# Patient Record
Sex: MALE | Race: WHITE | NOT HISPANIC OR LATINO | Employment: FULL TIME | URBAN - METROPOLITAN AREA
[De-identification: names, ages, dates, MRNs, and addresses within clinical notes are randomized per-mention and may not be internally consistent; named-entity substitution may affect disease eponyms.]

---

## 2024-01-10 ENCOUNTER — TRANSCRIBE ORDERS (OUTPATIENT)
Dept: ADMINISTRATIVE | Facility: HOSPITAL | Age: 39
End: 2024-01-10
Payer: COMMERCIAL

## 2024-01-10 ENCOUNTER — HOSPITAL ENCOUNTER (OUTPATIENT)
Dept: GENERAL RADIOLOGY | Facility: HOSPITAL | Age: 39
Discharge: HOME | End: 2024-01-10
Payer: COMMERCIAL

## 2024-01-10 DIAGNOSIS — M25.562 ACUTE PAIN OF LEFT KNEE: Primary | ICD-10-CM

## 2024-01-10 DIAGNOSIS — M25.562 ACUTE PAIN OF LEFT KNEE: ICD-10-CM

## 2024-01-10 PROCEDURE — 73562 X-RAY EXAM OF KNEE 3: CPT

## 2024-02-02 NOTE — PROGRESS NOTES
New Knee      Patient: Richard Yoo        YOB: 1985    Medical Record Number: [id number]        Chief Complaints: Left knee pain      History of Present Illness: This is a 38-year-old male who presents complaining of left knee pain he states it began January 10 of this year he stumbled in his front yard his left knee popped but he states it never really swelled it was never hurting at night he did do ice heat red light therapy and he states his symptoms are nearly gone he can do just about everything he wants to do he works at [employer] and is able to do that he does have a history when he was younger of a leg length discrepancy and 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     No current facility-administered medications on file prior to visit.     Current Medications:  Scheduled Meds:  Continuous Infusions:No current facility-administered medications for this visit.    PRN Meds:.    No past medical history on file.   No past surgical history on file.     Social History     Occupational History    Not on file   Tobacco Use    Smoking status: Not on file    Smokeless tobacco: Not on file   Substance and Sexual Activity    Alcohol use: Not on file    Drug use: Not on file    Sexual activity: Not on file      Social History     Social History Narrative    Not on file      No family history on file.          Review of Systems:     Review of Systems      Physical Exam: 38 y.o. male  General Appearance:    Alert, cooperative, in no acute distress                 There were no vitals filed for this visit.   Patient is alert and read ×3 no acute distress appears her above-listed at height weight and age.  Affect is normal respiratory rate is normal unlabored. Heart rate regular rate rhythm, sclera, dentition and hearing are normal for the purpose of this  exam.        Ortho Exam  Exam of the left knee no effusion no redness he does have healed tiny surgical incisions where they shut down his growth plates his collaterals his ACL PCL appear intact he has a negative bounce home and negative Acacia's stable Temaz exam calf is soft and nontender  Procedures             Radiology:   AP, Lateral and merchant views of the left knee  were /reviewed to evauateknee pain.  I did review these in epic these are normal also reviewed his MRI which shows some changes in the lateral collateral I have reviewed and agree  Imaging Results (Most Recent)       None          Assessment/Plan:      Left knee injury which has resolved there are some changes lateral collateral collateral by exam and by his clinical presentation I do not think this is acute.  I am wondering how much of the findings on MRI related to his surgery to shut down his growth plate.  At any rate he is doing fine he is asymptomatic he will progress his activities we talked about the importance of quad and core strengthening and specific things for that if his knee starts bothering him again we will get him back in here

## 2024-02-05 ENCOUNTER — OFFICE VISIT (OUTPATIENT)
Dept: ORTHOPEDIC SURGERY | Facility: CLINIC | Age: 39
End: 2024-02-05
Payer: COMMERCIAL

## 2024-02-05 VITALS — WEIGHT: 189.5 LBS | TEMPERATURE: 98.2 F | HEIGHT: 75 IN | BODY MASS INDEX: 23.56 KG/M2

## 2024-02-05 DIAGNOSIS — M25.562 ACUTE PAIN OF LEFT KNEE: Primary | ICD-10-CM

## 2024-02-05 PROCEDURE — 99203 OFFICE O/P NEW LOW 30 MIN: CPT | Performed by: ORTHOPAEDIC SURGERY

## 2024-02-05 RX ORDER — BETAMETHASONE DIPROPIONATE 0.5 MG/G
SPRAY TOPICAL
COMMUNITY

## 2024-02-05 RX ORDER — VITAMIN B COMPLEX
CAPSULE ORAL DAILY
COMMUNITY

## 2024-02-05 RX ORDER — MULTIPLE VITAMINS W/ MINERALS TAB 9MG-400MCG
1 TAB ORAL DAILY
COMMUNITY

## 2024-02-05 RX ORDER — CETIRIZINE HYDROCHLORIDE 10 MG/1
CAPSULE, LIQUID FILLED ORAL
COMMUNITY

## 2024-02-05 RX ORDER — L.ACID,CASEI/B.ANIMAL/S.THERMO 16B CELL
1 CAPSULE ORAL DAILY
COMMUNITY

## 2024-05-28 ENCOUNTER — OFFICE VISIT (OUTPATIENT)
Dept: SURGERY | Facility: CLINIC | Age: 39
End: 2024-05-28
Payer: COMMERCIAL

## 2024-05-28 VITALS — BODY MASS INDEX: 23.75 KG/M2 | WEIGHT: 191 LBS | RESPIRATION RATE: 16 BRPM | HEIGHT: 75 IN

## 2024-05-28 DIAGNOSIS — K62.9 PERIANAL LESION: Primary | ICD-10-CM

## 2024-05-29 NOTE — PROGRESS NOTES
General Surgery/Colorectal Surgery Note    Patient Name:  Richard Yoo  YOB: 1985  [id number]    Referring Provider: Steffi Santamaria APRN      Patient Care Team:  Lashell Kearns APRN as PCP - General (Nurse Practitioner)  Pancho Vincent MD as Consulting Physician (General Surgery)    Chief complaint anal lesion    Subjective .     History of present illness:    Comes in for concern of a mole to the left side of his anus.  Unknown onset.  No history of the same.  No personal family history of colorectal cancer, anal cancer, melanoma.  No blood thinner use.  Asymptomatic with no pain bleeding.      History:  History reviewed. No pertinent past medical history.    Past Surgical History:   Procedure Laterality Date    CYST REMOVAL      KNEE SURGERY      NEPHRECTOMY Right        History reviewed. No pertinent family history.    Social History     Tobacco Use    Smoking status: Never    Smokeless tobacco: Never   Vaping Use    Vaping status: Never Used   Substance Use Topics    Alcohol use: Yes     Comment: once a month if that    Drug use: Defer       Review of Systems  All systems were reviewed and negative except for:   Review of Systems   Constitutional: Negative for chills, fever and unexpected weight loss.   HENT: Negative for congestion, nosebleeds and voice change.    Eyes: Negative for blurred vision, double vision and discharge.   Respiratory: Negative for apnea, chest tightness and shortness of breath.    Cardiovascular: Negative for chest pain and leg swelling.   Gastrointestinal: Negative nausea vomit diarrhea abdominal pain   Endocrine: Negative for cold intolerance and heat intolerance.   Genitourinary: Negative for dysuria, hematuria and urgency.   Musculoskeletal: Negative for back pain, joint swelling and neck pain.   Skin: Negative for color change and dry skin.   Neurological: Negative for dizziness and confusion.   Hematological: Negative for adenopathy.    Psychiatric/Behavioral: Negative for agitation and behavioral problems.     MEDS:  Prior to Admission medications    Medication Sig Start Date End Date Taking? Authorizing Provider   B Complex Vitamins (vitamin b complex) capsule capsule Take  by mouth Daily.   Yes Nahid Diaz MD   Cetirizine HCl (ZyrTEC Allergy) 10 MG capsule ZyrTEC   Yes Nahid Diaz MD   multivitamin with minerals tablet tablet Take 1 tablet by mouth Daily.   Yes Nahid Diaz MD   Probiotic Product (Risaquad-2) capsule capsule Take 1 capsule by mouth Daily.   Yes Nahid Diaz MD   Sernivo 0.05 % emulsion APPLY TO THE AFFECTED AREA(S) OF THE ELBOWS, LOWER LEGS AND FEET TWICE A DAY. DO NOT APPLY TO FACE OR FOLDS   Yes Nahid Diaz MD        Allergies:  Kiwi extract    Objective     Vital Signs        Physical Exam:     General Appearance:    Alert, cooperative, in no acute distress   Head:    Normocephalic, without obvious abnormality, atraumatic   Eyes:          Conjunctivae and sclerae normal, no icterus,     Ears:    Ears appear intact with no abnormalities noted   Throat:   No oral lesions, no thrush, oral mucosa moist   Neck:   No adenopathy, supple, trachea midline, no thyromegaly   Back:     No kyphosis present, no scoliosis present, no skin lesions,      erythema or scars, no tenderness to percussion or                   palpation,   range of motion normal   Lungs:     Clear to auscultation,respirations regular, even and                  unlabored    Heart:    Regular rhythm and normal rate, normal S1 and S2, no            murmur, no gallop, no rub, no click   Chest Wall:    No abnormalities observed   Abdomen:     Normal bowel sounds, no masses, no organomegaly, soft        non-tender, non-distended, no guarding, no rebound                tenderness   Rectal:   Mild to the left of the anus, no features concerning for melanoma at this time, nonraised, irregular border, only 1 color  "  Extremities:   Moves all extremities well, no edema, no cyanosis, no             redness   Pulses:   Pulses palpable and equal bilaterally   Skin:   No bleeding, bruising or rash   Lymph nodes:   No palpable adenopathy   Neurologic:   A/o x 4 with no deficits       Results Review:   {Results Review:02050::\"I reviewed the patient's new clinical results.\"    LABS/IMAGING:  No results found for this or any previous visit.     Result Review :     Assessment & Plan     Perianal mole    I reassured the patient's there is no evidence of any cancer.  Follow-up with me in 1 year for skin check.  Follow-up sooner for change in the bowel, elevation, bleeding, concern.  All questions answered.  He agrees with the plan.  Thank for the consult.           This document has been electronically signed by Pancho Vincent MD  May 29, 2024 11:13 EDT  "

## 2025-06-03 ENCOUNTER — OFFICE VISIT (OUTPATIENT)
Dept: SURGERY | Facility: CLINIC | Age: 40
End: 2025-06-03
Payer: COMMERCIAL

## 2025-06-03 VITALS — HEIGHT: 75 IN | BODY MASS INDEX: 24.25 KG/M2 | WEIGHT: 195 LBS

## 2025-06-03 DIAGNOSIS — K62.9 LESION OF PERIANAL AREA: Primary | ICD-10-CM

## 2025-06-03 RX ORDER — EPINEPHRINE 0.3 MG/.3ML
INJECTION SUBCUTANEOUS
COMMUNITY
Start: 2025-02-26

## 2025-06-03 RX ORDER — ROFLUMILAST 1.5 MG/G
CREAM TOPICAL
COMMUNITY
Start: 2025-05-07

## 2025-06-03 NOTE — LETTER
June 5, 2025     ENOCH Gallego  [address]  [address] [place] [id number]    Patient: Richard oYo   YOB: 1985   Date of Visit: 6/3/2025     Dear ENOCH Gallego:       Thank you for referring Richard Yoo to me for evaluation. Below are the relevant portions of my assessment and plan of care.    If you have questions, please do not hesitate to call me. I look forward to following Richard along with you.         Sincerely,        Pancho Vincent MD        CC: No Recipients    Pancho Vincent MD  06/05/25 0817  Sign when Signing Visit  General Surgery/Colorectal Surgery Note    Patient Name:  Richard Yoo  YOB: 1985  [id number]    Referring Provider: No ref. provider found      Patient Care Team:  Lashell Kearns APRN as PCP - General (Nurse Practitioner)  Pancho Vincent MD as Consulting Physician (General Surgery)    Chief complaint follow-up    Subjective.     History of present illness:    Previously seen with history of a mole to the left side of his anus.  Unknown onset.  No history of the same.  No personal family history of colorectal cancer, anal cancer, melanoma.  No blood thinner use.  Asymptomatic with no pain bleeding.    He comes in for follow-up.  No changes in the mole that the patient is aware of.  No pain or bleeding.  No changes in health or medication since last seen.    History:  History reviewed. No pertinent past medical history.    Past Surgical History:   Procedure Laterality Date   • CYST REMOVAL     • KNEE SURGERY     • NEPHRECTOMY Right        History reviewed. No pertinent family history.    Social History     Tobacco Use   • Smoking status: Never   • Smokeless tobacco: Never   Vaping Use   • Vaping status: Never Used   Substance Use Topics   • Alcohol use: Yes     Comment: once a month if that   • Drug use: Defer       Review of Systems  All systems were reviewed and negative except for:   Review of  Systems   Constitutional: Negative for chills, fever and unexpected weight loss.   HENT: Negative for congestion, nosebleeds and voice change.    Eyes: Negative for blurred vision, double vision and discharge.   Respiratory: Negative for apnea, chest tightness and shortness of breath.    Cardiovascular: Negative for chest pain and leg swelling.   Gastrointestinal:        See HPI   Endocrine: Negative for cold intolerance and heat intolerance.   Genitourinary: Negative for dysuria, hematuria and urgency.   Musculoskeletal: Negative for back pain, joint swelling and neck pain.   Skin: Negative for color change and dry skin.  See HPI  Neurological: Negative for dizziness and confusion.   Hematological: Negative for adenopathy.   Psychiatric/Behavioral: Negative for agitation and behavioral problems.     MEDS:  Prior to Admission medications    Medication Sig Start Date End Date Taking? Authorizing Provider   B Complex Vitamins (vitamin b complex) capsule capsule Take  by mouth Daily.   Yes Nahid Diaz MD   Cetirizine HCl (ZyrTEC Allergy) 10 MG capsule ZyrTEC   Yes Nahid Diaz MD   EPINEPHrine (EPIPEN) 0.3 MG/0.3ML solution auto-injector injection INJECT CONTENTS OF 1 INJECTOR INTO OUTER THIGH AS NEEDED FOR ANAPHYLAXIS/ALLERGIC REACTION. CALL 911 IF USED. 2/26/25  Yes Nahid Diaz MD   multivitamin with minerals tablet tablet Take 1 tablet by mouth Daily.   Yes Nahid Diaz MD   Probiotic Product (Risaquad-2) capsule capsule Take 1 capsule by mouth Daily.   Yes Nahid Diaz MD   Sernivo 0.05 % emulsion APPLY TO THE AFFECTED AREA(S) OF THE ELBOWS, LOWER LEGS AND FEET TWICE A DAY. DO NOT APPLY TO FACE OR FOLDS   Yes Nahid Diaz MD   Zoryve 0.15 % cream Apply  topically to the appropriate area as directed. 5/7/25  Yes Nahid Diaz MD        Allergies:  Kiwi extract    Objective    Vital Signs        Physical Exam:     General Appearance:    Alert,  "cooperative, in no acute distress   Head:    Normocephalic, without obvious abnormality, atraumatic   Eyes:          Conjunctivae and sclerae normal, no icterus,     Ears:    Ears appear intact with no abnormalities noted   Throat:   No oral lesions, no thrush, oral mucosa moist   Neck:   No adenopathy, supple, trachea midline, no thyromegaly   Back:     No kyphosis present, no scoliosis present, no skin lesions,      erythema or scars, no tenderness to percussion or                   palpation,   range of motion normal   Lungs:     Clear to auscultation,respirations regular, even and                  unlabored    Heart:    Regular rhythm and normal rate, normal S1 and S2, no            murmur, no gallop, no rub, no click   Chest Wall:    No abnormalities observed   Abdomen:     Normal bowel sounds, no masses, no organomegaly, soft        non-tender, non-distended, no guarding, no rebound                tenderness   Rectal:   Left perianal mole with no findings concerning for cancer at this time   Extremities:   Moves all extremities well, no edema, no cyanosis, no             redness   Pulses:   Pulses palpable and equal bilaterally   Skin:   No bleeding, bruising or rash   Lymph nodes:   No palpable adenopathy   Neurologic:   A/o x 4 with no deficits       Results Review:   {Results Review:95250::\"I reviewed the patient's new clinical results.\"    LABS/IMAGING:  No results found for this or any previous visit.     Result Review: Labs  Result Review  Imaging  Med Tab  Media     Assessment & Plan    Left perianal mole    I reassured the patient the mole does not appear to have changed since last seen.  I encouraged him to monitor this every 6 months and contact me if it increases in size, changes color, elevates, concern.  Follow-up with me in 2 years.  All questions answered.  He agrees with the plan.  Thank for the consult.              This document has been electronically signed by Pancho Vincent, " MD  June 5, 2025 08:10 EDT

## 2025-06-05 NOTE — PROGRESS NOTES
General Surgery/Colorectal Surgery Note    Patient Name:  Richard Yoo  YOB: 1985  [id number]    Referring Provider: No ref. provider found      Patient Care Team:  Lashell Kearns APRN as PCP - General (Nurse Practitioner)  Pancho Vincent MD as Consulting Physician (General Surgery)    Chief complaint follow-up    Subjective .     History of present illness:    Previously seen with history of a mole to the left side of his anus.  Unknown onset.  No history of the same.  No personal family history of colorectal cancer, anal cancer, melanoma.  No blood thinner use.  Asymptomatic with no pain bleeding.    He comes in for follow-up.  No changes in the mole that the patient is aware of.  No pain or bleeding.  No changes in health or medication since last seen.    History:  History reviewed. No pertinent past medical history.    Past Surgical History:   Procedure Laterality Date    CYST REMOVAL      KNEE SURGERY      NEPHRECTOMY Right        History reviewed. No pertinent family history.    Social History     Tobacco Use    Smoking status: Never    Smokeless tobacco: Never   Vaping Use    Vaping status: Never Used   Substance Use Topics    Alcohol use: Yes     Comment: once a month if that    Drug use: Defer       Review of Systems  All systems were reviewed and negative except for:   Review of Systems   Constitutional: Negative for chills, fever and unexpected weight loss.   HENT: Negative for congestion, nosebleeds and voice change.    Eyes: Negative for blurred vision, double vision and discharge.   Respiratory: Negative for apnea, chest tightness and shortness of breath.    Cardiovascular: Negative for chest pain and leg swelling.   Gastrointestinal:        See HPI   Endocrine: Negative for cold intolerance and heat intolerance.   Genitourinary: Negative for dysuria, hematuria and urgency.   Musculoskeletal: Negative for back pain, joint swelling and neck pain.   Skin: Negative for  color change and dry skin.  See HPI  Neurological: Negative for dizziness and confusion.   Hematological: Negative for adenopathy.   Psychiatric/Behavioral: Negative for agitation and behavioral problems.     MEDS:  Prior to Admission medications    Medication Sig Start Date End Date Taking? Authorizing Provider   B Complex Vitamins (vitamin b complex) capsule capsule Take  by mouth Daily.   Yes Nahid Diaz MD   Cetirizine HCl (ZyrTEC Allergy) 10 MG capsule ZyrTEC   Yes Nahid Diaz MD   EPINEPHrine (EPIPEN) 0.3 MG/0.3ML solution auto-injector injection INJECT CONTENTS OF 1 INJECTOR INTO OUTER THIGH AS NEEDED FOR ANAPHYLAXIS/ALLERGIC REACTION. CALL 911 IF USED. 2/26/25  Yes Nahid Diaz MD   multivitamin with minerals tablet tablet Take 1 tablet by mouth Daily.   Yes Nahid Diaz MD   Probiotic Product (Risaquad-2) capsule capsule Take 1 capsule by mouth Daily.   Yes Nahid Diaz MD   Sernivo 0.05 % emulsion APPLY TO THE AFFECTED AREA(S) OF THE ELBOWS, LOWER LEGS AND FEET TWICE A DAY. DO NOT APPLY TO FACE OR FOLDS   Yes Nahid Diaz MD   Zoryve 0.15 % cream Apply  topically to the appropriate area as directed. 5/7/25  Yes Nahid Diaz MD        Allergies:  Kiwi extract    Objective     Vital Signs        Physical Exam:     General Appearance:    Alert, cooperative, in no acute distress   Head:    Normocephalic, without obvious abnormality, atraumatic   Eyes:          Conjunctivae and sclerae normal, no icterus,     Ears:    Ears appear intact with no abnormalities noted   Throat:   No oral lesions, no thrush, oral mucosa moist   Neck:   No adenopathy, supple, trachea midline, no thyromegaly   Back:     No kyphosis present, no scoliosis present, no skin lesions,      erythema or scars, no tenderness to percussion or                   palpation,   range of motion normal   Lungs:     Clear to auscultation,respirations regular, even and                   "unlabored    Heart:    Regular rhythm and normal rate, normal S1 and S2, no            murmur, no gallop, no rub, no click   Chest Wall:    No abnormalities observed   Abdomen:     Normal bowel sounds, no masses, no organomegaly, soft        non-tender, non-distended, no guarding, no rebound                tenderness   Rectal:   Left perianal mole with no findings concerning for cancer at this time   Extremities:   Moves all extremities well, no edema, no cyanosis, no             redness   Pulses:   Pulses palpable and equal bilaterally   Skin:   No bleeding, bruising or rash   Lymph nodes:   No palpable adenopathy   Neurologic:   A/o x 4 with no deficits       Results Review:   {Results Review:43258::\"I reviewed the patient's new clinical results.\"    LABS/IMAGING:  No results found for this or any previous visit.     Result Review : Labs  Result Review  Imaging  Med Tab  Media     Assessment & Plan     Left perianal mole    I reassured the patient the mole does not appear to have changed since last seen.  I encouraged him to monitor this every 6 months and contact me if it increases in size, changes color, elevates, concern.  Follow-up with me in 2 years.  All questions answered.  He agrees with the plan.  Thank for the consult.              This document has been electronically signed by Pancho Vincent MD  June 5, 2025 08:10 EDT  "